# Patient Record
Sex: FEMALE | Race: ASIAN | Employment: FULL TIME | URBAN - METROPOLITAN AREA
[De-identification: names, ages, dates, MRNs, and addresses within clinical notes are randomized per-mention and may not be internally consistent; named-entity substitution may affect disease eponyms.]

---

## 2017-05-01 ENCOUNTER — TELEPHONE (OUTPATIENT)
Dept: FAMILY MEDICINE CLINIC | Facility: CLINIC | Age: 52
End: 2017-05-01

## 2017-05-01 DIAGNOSIS — Z12.31 ENCOUNTER FOR SCREENING MAMMOGRAM FOR BREAST CANCER: Primary | ICD-10-CM

## 2017-05-02 ENCOUNTER — HOSPITAL ENCOUNTER (OUTPATIENT)
Dept: MAMMOGRAPHY | Age: 52
Discharge: HOME | End: 2017-05-02
Attending: FAMILY MEDICINE
Payer: COMMERCIAL

## 2017-05-02 DIAGNOSIS — Z12.31 ENCOUNTER FOR SCREENING MAMMOGRAM FOR BREAST CANCER: ICD-10-CM

## 2017-05-02 PROCEDURE — 77067 SCR MAMMO BI INCL CAD: CPT

## 2017-05-10 ENCOUNTER — HOSPITAL ENCOUNTER (OUTPATIENT)
Dept: MAMMOGRAPHY | Facility: HOSPITAL | Age: 52
Discharge: HOME | End: 2017-05-10
Attending: FAMILY MEDICINE
Payer: COMMERCIAL

## 2017-05-10 DIAGNOSIS — R92.8 ABNORMAL MAMMOGRAM OF LEFT BREAST: ICD-10-CM

## 2017-05-10 PROCEDURE — 77065 DX MAMMO INCL CAD UNI: CPT | Performed by: FAMILY MEDICINE

## 2017-05-10 PROCEDURE — 76642 ULTRASOUND BREAST LIMITED: CPT | Performed by: FAMILY MEDICINE

## 2017-05-10 PROCEDURE — 77061 BREAST TOMOSYNTHESIS UNI: CPT | Performed by: FAMILY MEDICINE

## 2017-05-13 ENCOUNTER — TELEPHONE (OUTPATIENT)
Dept: FAMILY MEDICINE CLINIC | Facility: CLINIC | Age: 52
End: 2017-05-13

## 2017-05-13 DIAGNOSIS — N60.02 BREAST CYST, LEFT: Primary | ICD-10-CM

## 2017-05-13 NOTE — TELEPHONE ENCOUNTER
----- Message from Abiodun Gonsalez MD sent at 5/12/2017  8:01 AM CDT -----  Ferdinand looks benign, but needs repeat left US in 6 months. Please order.   Dx left breast cyst

## 2017-05-13 NOTE — TELEPHONE ENCOUNTER
Discussed additional view of L breast results with patient by phone, letting her know she needs to repeat a L breast US in 6 months.  Order entered per . . Patient verbalizes understanding.

## 2017-05-13 NOTE — PROGRESS NOTES
Quick Note:    Discussed additional view of L breast results with patient by phone, letting her know she needs to repeat a L breast US in 6 months. Order entered per .  Patient verbalizes understanding.  ______

## 2017-06-03 ENCOUNTER — APPOINTMENT (OUTPATIENT)
Dept: LAB | Age: 52
End: 2017-06-03
Attending: NURSE PRACTITIONER
Payer: COMMERCIAL

## 2017-06-03 ENCOUNTER — OFFICE VISIT (OUTPATIENT)
Dept: FAMILY MEDICINE CLINIC | Facility: CLINIC | Age: 52
End: 2017-06-03

## 2017-06-03 VITALS
DIASTOLIC BLOOD PRESSURE: 70 MMHG | HEIGHT: 61.5 IN | WEIGHT: 127.81 LBS | BODY MASS INDEX: 23.82 KG/M2 | SYSTOLIC BLOOD PRESSURE: 98 MMHG | TEMPERATURE: 98 F | RESPIRATION RATE: 16 BRPM | HEART RATE: 72 BPM

## 2017-06-03 DIAGNOSIS — Z13.0 SCREENING FOR ENDOCRINE, NUTRITIONAL, METABOLIC AND IMMUNITY DISORDER: ICD-10-CM

## 2017-06-03 DIAGNOSIS — Z13.29 SCREENING FOR ENDOCRINE, NUTRITIONAL, METABOLIC AND IMMUNITY DISORDER: ICD-10-CM

## 2017-06-03 DIAGNOSIS — Z13.228 SCREENING FOR ENDOCRINE, NUTRITIONAL, METABOLIC AND IMMUNITY DISORDER: ICD-10-CM

## 2017-06-03 DIAGNOSIS — Z13.21 SCREENING FOR ENDOCRINE, NUTRITIONAL, METABOLIC AND IMMUNITY DISORDER: ICD-10-CM

## 2017-06-03 DIAGNOSIS — D64.89 ANEMIA DUE TO OTHER CAUSE: ICD-10-CM

## 2017-06-03 DIAGNOSIS — Z00.00 ROUTINE GENERAL MEDICAL EXAMINATION AT A HEALTH CARE FACILITY: Primary | ICD-10-CM

## 2017-06-03 DIAGNOSIS — Z00.00 ROUTINE GENERAL MEDICAL EXAMINATION AT A HEALTH CARE FACILITY: ICD-10-CM

## 2017-06-03 PROCEDURE — 82728 ASSAY OF FERRITIN: CPT | Performed by: NURSE PRACTITIONER

## 2017-06-03 PROCEDURE — 82306 VITAMIN D 25 HYDROXY: CPT | Performed by: NURSE PRACTITIONER

## 2017-06-03 PROCEDURE — 90715 TDAP VACCINE 7 YRS/> IM: CPT | Performed by: NURSE PRACTITIONER

## 2017-06-03 PROCEDURE — 90471 IMMUNIZATION ADMIN: CPT | Performed by: NURSE PRACTITIONER

## 2017-06-03 PROCEDURE — 83550 IRON BINDING TEST: CPT | Performed by: NURSE PRACTITIONER

## 2017-06-03 PROCEDURE — 85027 COMPLETE CBC AUTOMATED: CPT | Performed by: NURSE PRACTITIONER

## 2017-06-03 PROCEDURE — 99396 PREV VISIT EST AGE 40-64: CPT | Performed by: NURSE PRACTITIONER

## 2017-06-03 PROCEDURE — 83540 ASSAY OF IRON: CPT | Performed by: NURSE PRACTITIONER

## 2017-06-03 PROCEDURE — 82607 VITAMIN B-12: CPT | Performed by: NURSE PRACTITIONER

## 2017-06-03 NOTE — PROGRESS NOTES
HPI:   Hi Draper is a 46year old female who presents for a complete physical exam. Rhina Jordan 1 year ago. Since LOV she had a bleeding diverticula which resulted in anemia. She brings work labs with last CBC in 3/2017 with level of 10.8.  She denies any b recurrent Breast CA, first dx late 46s   • Breast Cancer Mother      early 46s   • Alpha-thalassemia Trait[other] [place] Dy Daughter       Social History:     Smoking Status: Never Smoker                      Smokeless Status: Never Used Vitamin D, 25-Hydroxy [E]  Vitamin B12 [E]  TdaP (Adacel, Boostrix) (92869) (DX V06.1/Z23)    Patient Instructions   Immunizations reviewed, recommend: flu vaccine this fall season, Tdap booster given today.   Supplements recommended: Vitamin D 1000 IU ana As a woman ages, her ovaries begin to make hormones less regularly. In some months, there may not be ovulation. Without ovulation, progesterone isn't secreted so a normal period doesn't occur. The menstrual cycle will be harder to predict.  Over time, the o © 6730-2962 The [address] [place] [address], [address] [place]. All rights reserved. This information is not intended as a substitute for professional medical care. Always follow your healthcare professional's instructions.             Ret

## 2017-06-03 NOTE — PATIENT INSTRUCTIONS
Immunizations reviewed, recommend: flu vaccine this fall season, Tdap booster given today. Supplements recommended: Vitamin D 1000 IU daily, Calcium 500 mg twice a day with food if not part of diet. Recommend Fiber 25 grams daily.   Self breast exam month cause symptoms. Some women who have had their uterus taken out (hysterectomy) but still have ovaries may still have symptoms. When estrogen levels reach their lowest point, periods will stop completely. This is menopause.   Symptoms of perimenopause  The ch

## 2017-06-07 ENCOUNTER — TELEPHONE (OUTPATIENT)
Dept: FAMILY MEDICINE CLINIC | Facility: CLINIC | Age: 52
End: 2017-06-07

## 2017-06-07 DIAGNOSIS — D56.9 THALASSEMIA, UNSPECIFIED TYPE: Primary | ICD-10-CM

## 2017-06-07 NOTE — TELEPHONE ENCOUNTER
Spoke with Pt and spoke with the lab about doing additional testing for Thalassemia. This is a special test that will be sent out.  So the two options are Hemoglobinopathy with reflex Cascade code# [id number], or Hemoglobinopathy with reflex Electrophoresis

## 2017-07-01 ENCOUNTER — APPOINTMENT (OUTPATIENT)
Dept: GENERAL RADIOLOGY | Age: 52
End: 2017-07-01
Attending: FAMILY MEDICINE
Payer: COMMERCIAL

## 2017-07-01 ENCOUNTER — HOSPITAL ENCOUNTER (OUTPATIENT)
Age: 52
Discharge: HOME | End: 2017-07-01
Attending: FAMILY MEDICINE
Payer: COMMERCIAL

## 2017-07-01 VITALS
SYSTOLIC BLOOD PRESSURE: 134 MMHG | RESPIRATION RATE: 18 BRPM | TEMPERATURE: 98 F | DIASTOLIC BLOOD PRESSURE: 86 MMHG | OXYGEN SATURATION: 98 % | HEART RATE: 73 BPM

## 2017-07-01 DIAGNOSIS — S99.912A ANKLE INJURY, LEFT, INITIAL ENCOUNTER: ICD-10-CM

## 2017-07-01 DIAGNOSIS — S93.402A MODERATE ANKLE SPRAIN, LEFT, INITIAL ENCOUNTER: Primary | ICD-10-CM

## 2017-07-01 PROCEDURE — 99214 OFFICE O/P EST MOD 30 MIN: CPT

## 2017-07-01 PROCEDURE — 73610 X-RAY EXAM OF ANKLE: CPT | Performed by: FAMILY MEDICINE

## 2017-07-01 RX ORDER — IBUPROFEN 200 MG
200 TABLET ORAL EVERY 6 HOURS PRN
COMMUNITY

## 2017-07-01 RX ORDER — IBUPROFEN 800 MG/1
800 TABLET ORAL EVERY 8 HOURS PRN
Qty: 21 TABLET | Refills: 0 | Status: SHIPPED | OUTPATIENT
Start: 2017-07-01 | End: 2017-07-08

## 2017-07-01 NOTE — ED INITIAL ASSESSMENT (HPI)
Patient presents with left ankle injury from today when her foot fell asleep and she stood up and rolled her ankle externally. Swelling and throbbing pain. +CMS

## 2017-07-01 NOTE — ED PROVIDER NOTES
Patient Seen in: [facility] [place] [facility]    History   Patient presents with:  Lower Extremity Injury (musculoskeletal)    Stated Complaint: ANKLE SPRAIN     HPI  60-year-old female here with left ankle sprain, notes that she was seated for a Take by mouth.        Family History   Problem Relation Age of Onset   • Cancer Father 58     LUNG   • Cancer Mother      recurrent Breast CA, first dx late 46s   • Breast Cancer Mother      early 46s   • Alpha-thalassemia Trait[other] Hitesh Guerra Daughter Once          Order Comments:              ANKLE SPRAIN             Order Specific Question: What is the Relevant Clinical Indication / Reason for Exam?          Answer: ANKLE SPRAIN       Apply ace wrap Once          Order Comments:               To affect tolerated   Rx Ibuprofen 800mg every 8 hours with food / milk for anti-inflammatory properties (DO NOT TAKE ANY OTC MOTRIN / ADVIL WHILE ON THIS)   Worsening symptoms discussed and if so to return immediately   Possibility of occult fracture discussed and

## 2018-02-15 ENCOUNTER — TELEPHONE (OUTPATIENT)
Dept: FAMILY MEDICINE CLINIC | Facility: CLINIC | Age: 53
End: 2018-02-15

## 2018-02-15 DIAGNOSIS — Z00.00 LABORATORY EXAM ORDERED AS PART OF ROUTINE GENERAL MEDICAL EXAMINATION: Primary | ICD-10-CM

## 2018-02-15 DIAGNOSIS — Z12.31 VISIT FOR SCREENING MAMMOGRAM: ICD-10-CM

## 2018-02-15 NOTE — TELEPHONE ENCOUNTER
Patient scheduled her physical for 5/12/18 and would like labs called into [facility]. Thank you. She would also like a mammogram order prior to her appt.

## 2018-04-21 ENCOUNTER — LAB ENCOUNTER (OUTPATIENT)
Dept: LAB | Age: 53
End: 2018-04-21
Attending: FAMILY MEDICINE
Payer: COMMERCIAL

## 2018-04-21 ENCOUNTER — HOSPITAL ENCOUNTER (OUTPATIENT)
Dept: MAMMOGRAPHY | Age: 53
Discharge: HOME | End: 2018-04-21
Attending: FAMILY MEDICINE
Payer: COMMERCIAL

## 2018-04-21 DIAGNOSIS — Z12.31 VISIT FOR SCREENING MAMMOGRAM: ICD-10-CM

## 2018-04-21 DIAGNOSIS — Z00.00 LABORATORY EXAM ORDERED AS PART OF ROUTINE GENERAL MEDICAL EXAMINATION: ICD-10-CM

## 2018-04-21 PROCEDURE — 77063 BREAST TOMOSYNTHESIS BI: CPT | Performed by: FAMILY MEDICINE

## 2018-04-21 PROCEDURE — 85025 COMPLETE CBC W/AUTO DIFF WBC: CPT

## 2018-04-21 PROCEDURE — 82728 ASSAY OF FERRITIN: CPT

## 2018-04-21 PROCEDURE — 77067 SCR MAMMO BI INCL CAD: CPT | Performed by: FAMILY MEDICINE

## 2018-04-21 PROCEDURE — 80050 GENERAL HEALTH PANEL: CPT

## 2018-04-21 PROCEDURE — 36415 COLL VENOUS BLD VENIPUNCTURE: CPT

## 2018-04-21 PROCEDURE — 80061 LIPID PANEL: CPT

## 2018-04-21 PROCEDURE — 80053 COMPREHEN METABOLIC PANEL: CPT

## 2018-05-12 ENCOUNTER — OFFICE VISIT (OUTPATIENT)
Dept: FAMILY MEDICINE CLINIC | Facility: CLINIC | Age: 53
End: 2018-05-12

## 2018-05-12 VITALS
SYSTOLIC BLOOD PRESSURE: 110 MMHG | BODY MASS INDEX: 24.79 KG/M2 | DIASTOLIC BLOOD PRESSURE: 80 MMHG | WEIGHT: 133 LBS | HEART RATE: 84 BPM | HEIGHT: 61.5 IN | TEMPERATURE: 98 F | RESPIRATION RATE: 16 BRPM

## 2018-05-12 DIAGNOSIS — Z11.51 SCREENING FOR HUMAN PAPILLOMAVIRUS (HPV): ICD-10-CM

## 2018-05-12 DIAGNOSIS — Z12.83 SKIN CANCER SCREENING: ICD-10-CM

## 2018-05-12 DIAGNOSIS — Z00.00 ROUTINE PHYSICAL EXAMINATION: Primary | ICD-10-CM

## 2018-05-12 PROCEDURE — 87624 HPV HI-RISK TYP POOLED RSLT: CPT | Performed by: FAMILY MEDICINE

## 2018-05-12 PROCEDURE — 88175 CYTOPATH C/V AUTO FLUID REDO: CPT | Performed by: FAMILY MEDICINE

## 2018-05-12 PROCEDURE — 99396 PREV VISIT EST AGE 40-64: CPT | Performed by: FAMILY MEDICINE

## 2018-05-12 NOTE — PROGRESS NOTES
HPI:   Oneil Jones is a 46year old female who presents for a complete physical exam.   (twins)  Last pap:   - normal, negative HPV  Last mammogram:  2018;  Hx of right breast biopsy and fibrocystic disease  Menses:  Irregular, perimenopausal PNEUMONIA   Family History   Problem Relation Age of Onset   • Cancer Father 58     LUNG   • Cancer Mother      recurrent Breast CA, first dx late 46s   • Breast Cancer Mother      early 46s   • [address] [facility], [address] [place] [address] Daughter       Social History human papillomavirus (hpv)  · Pap done today  · Mammogram:  annually. Self breast exam explained and encouraged to perform monthly. · Dexascan:  n/a. Recommended daily Vit D supplements.   · Labs reviewed  · Colonoscopy:  UTD  · Vaccines recommended toda

## 2019-07-12 ENCOUNTER — TELEPHONE (OUTPATIENT)
Dept: FAMILY MEDICINE CLINIC | Facility: CLINIC | Age: 54
End: 2019-07-12

## 2019-07-12 DIAGNOSIS — Z13.220 SCREENING, LIPID: ICD-10-CM

## 2019-07-12 DIAGNOSIS — Z13.29 SCREENING FOR ENDOCRINE, METABOLIC AND IMMUNITY DISORDER: ICD-10-CM

## 2019-07-12 DIAGNOSIS — Z13.0 SCREENING FOR ENDOCRINE, METABOLIC AND IMMUNITY DISORDER: ICD-10-CM

## 2019-07-12 DIAGNOSIS — Z13.0 SCREENING, ANEMIA, DEFICIENCY, IRON: Primary | ICD-10-CM

## 2019-07-12 DIAGNOSIS — Z13.228 SCREENING FOR ENDOCRINE, METABOLIC AND IMMUNITY DISORDER: ICD-10-CM

## 2019-07-12 DIAGNOSIS — Z12.39 SCREENING FOR BREAST CANCER: Primary | ICD-10-CM

## 2019-07-12 DIAGNOSIS — Z13.29 SCREENING FOR THYROID DISORDER: ICD-10-CM

## 2019-07-12 NOTE — TELEPHONE ENCOUNTER
Please enter lab orders for the patient's upcoming physical appointment. Physical scheduled:    Your appointments     Date & Time Appointment Department [facility])    Sep 03, 2019  3:00 PM CDT Physical - Established Patient with MD Michele Garcia

## 2019-07-12 NOTE — TELEPHONE ENCOUNTER
LOV 5/12/18. Future Appointments   Date Time Provider Uzair Nelson   9/3/2019  3:00 PM Monet Watson MD [facility] 3 [facility] [place]     It has been over one year since LOV. Fails protocol. Order for mammogram pended. Routed to Dr Yohana Beatty.

## 2019-07-22 ENCOUNTER — HOSPITAL ENCOUNTER (OUTPATIENT)
Dept: MAMMOGRAPHY | Age: 54
Discharge: HOME | End: 2019-07-22
Attending: FAMILY MEDICINE
Payer: COMMERCIAL

## 2019-07-22 DIAGNOSIS — Z12.39 SCREENING FOR BREAST CANCER: ICD-10-CM

## 2019-07-22 PROCEDURE — 77067 SCR MAMMO BI INCL CAD: CPT | Performed by: FAMILY MEDICINE

## 2019-07-22 PROCEDURE — 77063 BREAST TOMOSYNTHESIS BI: CPT | Performed by: FAMILY MEDICINE

## 2019-08-15 ENCOUNTER — HOSPITAL ENCOUNTER (OUTPATIENT)
Dept: ULTRASOUND IMAGING | Age: 54
Discharge: HOME | End: 2019-08-15
Attending: FAMILY MEDICINE
Payer: COMMERCIAL

## 2019-08-15 ENCOUNTER — TELEPHONE (OUTPATIENT)
Dept: FAMILY MEDICINE CLINIC | Facility: CLINIC | Age: 54
End: 2019-08-15

## 2019-08-15 ENCOUNTER — HOSPITAL ENCOUNTER (OUTPATIENT)
Dept: MAMMOGRAPHY | Age: 54
Discharge: HOME | End: 2019-08-15
Attending: FAMILY MEDICINE
Payer: COMMERCIAL

## 2019-08-15 DIAGNOSIS — R92.2 INCONCLUSIVE MAMMOGRAM: ICD-10-CM

## 2019-08-15 DIAGNOSIS — R92.8 ABNORMALITY OF RIGHT BREAST ON SCREENING MAMMOGRAM: Primary | ICD-10-CM

## 2019-08-15 PROCEDURE — 76642 ULTRASOUND BREAST LIMITED: CPT | Performed by: FAMILY MEDICINE

## 2019-08-15 PROCEDURE — 77062 BREAST TOMOSYNTHESIS BI: CPT | Performed by: FAMILY MEDICINE

## 2019-08-15 PROCEDURE — 77066 DX MAMMO INCL CAD BI: CPT | Performed by: FAMILY MEDICINE

## 2019-08-15 NOTE — TELEPHONE ENCOUNTER
Mammography called to let us know that the patient's mammogram came back abnormal and they radiologist is recommending a biopsy.   She just wanted Suad to read the

## 2019-08-15 NOTE — IMAGING NOTE
This Breast Care RN assisted Dr. Marlon Ty with recommendation for a right breast 1 site stereotactic biopsy for calcifications. Procedure reviewed and all questions answered. Emotional and educational support given.    On the day of the biopsy, pt instruc

## 2019-08-22 ENCOUNTER — HOSPITAL ENCOUNTER (OUTPATIENT)
Dept: MAMMOGRAPHY | Facility: HOSPITAL | Age: 54
Discharge: HOME | End: 2019-08-22
Attending: FAMILY MEDICINE
Payer: COMMERCIAL

## 2019-08-22 DIAGNOSIS — R92.8 ABNORMALITY OF RIGHT BREAST ON SCREENING MAMMOGRAM: ICD-10-CM

## 2019-08-22 PROCEDURE — 88305 TISSUE EXAM BY PATHOLOGIST: CPT | Performed by: FAMILY MEDICINE

## 2019-08-22 PROCEDURE — 19081 BX BREAST 1ST LESION STRTCTC: CPT | Performed by: FAMILY MEDICINE

## 2019-08-27 ENCOUNTER — TELEPHONE (OUTPATIENT)
Dept: MAMMOGRAPHY | Facility: HOSPITAL | Age: 54
End: 2019-08-27

## 2019-08-27 NOTE — TELEPHONE ENCOUNTER
LM to call back. Attempting to reach pt to discuss benign and concordant breast biopsy results. Follow up after breast biopsy for healing.  Encouraged to call back

## 2019-08-28 ENCOUNTER — TELEPHONE (OUTPATIENT)
Dept: MAMMOGRAPHY | Facility: HOSPITAL | Age: 54
End: 2019-08-28

## 2019-08-28 NOTE — TELEPHONE ENCOUNTER
Telephoned [facility] and name,  verified with pt. [facility] of benign RB stereo biopsy result. Concordance verified by radiologist, Dr. Aryan Haro. [facility] reports biopsy site is healing well. Hematoma management discussed.  [facility]

## 2019-09-03 ENCOUNTER — OFFICE VISIT (OUTPATIENT)
Dept: FAMILY MEDICINE CLINIC | Facility: CLINIC | Age: 54
End: 2019-09-03
Payer: COMMERCIAL

## 2019-09-03 VITALS
TEMPERATURE: 98 F | DIASTOLIC BLOOD PRESSURE: 60 MMHG | SYSTOLIC BLOOD PRESSURE: 100 MMHG | RESPIRATION RATE: 16 BRPM | BODY MASS INDEX: 24.73 KG/M2 | WEIGHT: 131 LBS | HEART RATE: 72 BPM | HEIGHT: 61 IN

## 2019-09-03 DIAGNOSIS — R05.8 POST-VIRAL COUGH SYNDROME: ICD-10-CM

## 2019-09-03 DIAGNOSIS — Z00.00 ROUTINE GENERAL MEDICAL EXAMINATION AT A HEALTH CARE FACILITY: Primary | ICD-10-CM

## 2019-09-03 DIAGNOSIS — R92.8 ABNORMAL MAMMOGRAM: ICD-10-CM

## 2019-09-03 PROCEDURE — 99396 PREV VISIT EST AGE 40-64: CPT | Performed by: FAMILY MEDICINE

## 2019-09-03 RX ORDER — PREDNISONE 20 MG/1
40 TABLET ORAL DAILY
Qty: 10 TABLET | Refills: 0 | Status: SHIPPED | OUTPATIENT
Start: 2019-09-03 | End: 2019-09-08

## 2019-09-03 NOTE — PROGRESS NOTES
HPI:   Alda Mendoza is a 48year old female who presents for a complete physical exam.   (twins)  Last pap:    Last mammogram:    Menses: LMP 3/2019  Contraception: Vasectomy  Previous colonoscopy:  2016  Previous DEXA:  n/a.   Current or p (CPT=19281)  2005   • OTHER SURGICAL HISTORY  2001    PNEUMONIA      Family History   Problem Relation Age of Onset   • Cancer Father 58        LUNG   • Cancer Mother         recurrent Breast CA, first dx late 46s   • Breast Cancer Mother         early 46s regular aerobic exercise. · The patient is asked to return for CPX in 1 year. · Prednisone as directed for post viral cough. Side effects reviewed. No orders of the defined types were placed in this encounter.       Meds & Refills for this Visit:  Reque

## 2019-10-22 ENCOUNTER — OFFICE VISIT (OUTPATIENT)
Dept: FAMILY MEDICINE CLINIC | Facility: CLINIC | Age: 54
End: 2019-10-22
Payer: COMMERCIAL

## 2019-10-22 ENCOUNTER — TELEPHONE (OUTPATIENT)
Dept: FAMILY MEDICINE CLINIC | Facility: CLINIC | Age: 54
End: 2019-10-22

## 2019-10-22 VITALS
BODY MASS INDEX: 25.07 KG/M2 | WEIGHT: 132.81 LBS | TEMPERATURE: 98 F | DIASTOLIC BLOOD PRESSURE: 70 MMHG | HEART RATE: 76 BPM | RESPIRATION RATE: 14 BRPM | HEIGHT: 61 IN | SYSTOLIC BLOOD PRESSURE: 118 MMHG

## 2019-10-22 DIAGNOSIS — N76.4 LABIAL ABSCESS: Primary | ICD-10-CM

## 2019-10-22 DIAGNOSIS — K64.4 EXTERNAL HEMORRHOID: ICD-10-CM

## 2019-10-22 DIAGNOSIS — B37.3 VAGINA, CANDIDIASIS: ICD-10-CM

## 2019-10-22 PROCEDURE — 99214 OFFICE O/P EST MOD 30 MIN: CPT | Performed by: FAMILY MEDICINE

## 2019-10-22 RX ORDER — CEPHALEXIN 500 MG/1
500 CAPSULE ORAL 3 TIMES DAILY
Qty: 30 CAPSULE | Refills: 0 | Status: SHIPPED | OUTPATIENT
Start: 2019-10-22 | End: 2019-11-05 | Stop reason: ALTCHOICE

## 2019-10-22 RX ORDER — FLUCONAZOLE 150 MG/1
150 TABLET ORAL ONCE
Qty: 3 TABLET | Refills: 0 | Status: SHIPPED | OUTPATIENT
Start: 2019-10-22 | End: 2019-10-22

## 2019-10-22 NOTE — TELEPHONE ENCOUNTER
Patient called states has a possible boil and yeast infection, asked to speak with nurse, no available appointments today.

## 2019-10-22 NOTE — PROGRESS NOTES
Chief Complaint:  Patient presents with:  Derm Problem: Painful bump on vagina for  5 days. Vaginal Problem: Pt complains of vaginal itching. Pt has tried Vagisil cream w/little relief.     HPI:  This is a 47year old female patient presenting for Derm Pr standard drinks    Drug use: No       fluconazole (DIFLUCAN) 150 MG Oral Tab, Take 1 tablet (150 mg total) by mouth once for 1 dose. May repeat in 2-3 days and then again after completing antibiotics if not improved. , Disp: 3 tablet, Rfl: 0  cephALEXin (KE abscess  -    Offered I and D but she declined. Will try warm water soaks and cephALEXin (KEFLEX) 500 MG Oral Cap; Take 1 capsule (500 mg total) by mouth 3 (three) times daily. External hemorrhoid  To monitor as it is not causing symptoms at this time.

## 2019-10-22 NOTE — TELEPHONE ENCOUNTER
Patient c/o a boil to left labia x 5-6 days. Patient experiencing vaginal itching x 4 days. Denies discharge. Has been applying \"Boil Ease\" cream and vagisil. Used Monistat 1 day last night. Appt given for today with Dr. Mahi Brandt.  Patient verbalized unders

## 2019-10-29 ENCOUNTER — TELEPHONE (OUTPATIENT)
Dept: FAMILY MEDICINE CLINIC | Facility: CLINIC | Age: 54
End: 2019-10-29

## 2019-10-29 DIAGNOSIS — B37.3 CANDIDA VAGINITIS: Primary | ICD-10-CM

## 2019-10-29 NOTE — TELEPHONE ENCOUNTER
Patient calling to report her yeast sx are not yet resolved. Still has some discharge and itching, but not quite as bad as previously. She took two doses of diflucan. Last dose was on Friday 10/25/2019. Prior to that patient took Monistat 1 day.  She is ask

## 2019-10-29 NOTE — TELEPHONE ENCOUNTER
Let's try prescription terconazole. I have sent in. Also, has abscess resolved. If symptoms not improving, to be seen in the office for recheck. Please let me know if you have any questions.   Samson Salas, DO 10/29/2019 2:52 PM

## 2019-10-29 NOTE — TELEPHONE ENCOUNTER
Pt said that the one boil that was ready to pop is now gone and there is just another small one that isn't doing anything. I told her to monitor that and if anything changes with it or she gets more, that you wanted to see her back in the office.

## 2019-10-29 NOTE — TELEPHONE ENCOUNTER
Patient requesting to speak with nurse states her symptoms from yeast infection have not gone away, has questions on the antibiotics as well

## 2019-11-04 ENCOUNTER — TELEPHONE (OUTPATIENT)
Dept: FAMILY MEDICINE CLINIC | Facility: CLINIC | Age: 54
End: 2019-11-04

## 2019-11-04 RX ORDER — FLUCONAZOLE 150 MG/1
TABLET ORAL
Refills: 0 | COMMUNITY
Start: 2019-10-22 | End: 2019-11-05 | Stop reason: ALTCHOICE

## 2019-11-04 NOTE — TELEPHONE ENCOUNTER
Called and LMOM to call back has had both fluconazole tablet and Terconazole cream without relief.  Should be seen to make sure this is yeast.

## 2019-11-04 NOTE — TELEPHONE ENCOUNTER
Pt still having Yeast infection symptoms. Does she need an appt or can she gets meds?  She did schedule an appt for tomorrow just in case but still requesting a call back

## 2019-11-05 ENCOUNTER — OFFICE VISIT (OUTPATIENT)
Dept: FAMILY MEDICINE CLINIC | Facility: CLINIC | Age: 54
End: 2019-11-05
Payer: COMMERCIAL

## 2019-11-05 VITALS
HEART RATE: 64 BPM | TEMPERATURE: 98 F | WEIGHT: 132 LBS | SYSTOLIC BLOOD PRESSURE: 118 MMHG | RESPIRATION RATE: 16 BRPM | HEIGHT: 61.5 IN | DIASTOLIC BLOOD PRESSURE: 70 MMHG | BODY MASS INDEX: 24.6 KG/M2

## 2019-11-05 DIAGNOSIS — N89.8 VAGINA ITCHING: Primary | ICD-10-CM

## 2019-11-05 PROCEDURE — 99213 OFFICE O/P EST LOW 20 MIN: CPT | Performed by: FAMILY MEDICINE

## 2019-11-05 PROCEDURE — 87510 GARDNER VAG DNA DIR PROBE: CPT | Performed by: FAMILY MEDICINE

## 2019-11-05 PROCEDURE — 87480 CANDIDA DNA DIR PROBE: CPT | Performed by: FAMILY MEDICINE

## 2019-11-05 PROCEDURE — 87660 TRICHOMONAS VAGIN DIR PROBE: CPT | Performed by: FAMILY MEDICINE

## 2019-11-05 RX ORDER — METRONIDAZOLE 7.5 MG/G
1 GEL VAGINAL NIGHTLY
Qty: 70 G | Refills: 0 | Status: SHIPPED | OUTPATIENT
Start: 2019-11-05 | End: 2019-11-12

## 2019-11-05 NOTE — PROGRESS NOTES
Chief Complaint:  Patient presents with:  Vaginal Problem:  Follow Up from 10-, States Boil has gone away but itchiness is still ongoing- having white discharge    HPI:  This is a 47year old female patient presenting for Vaginal Problem (Follow Up f Smoker      Smokeless tobacco: Never Used    Alcohol use: No      Alcohol/week: 0.0 standard drinks    Drug use: No       Current Outpatient Medications   Medication Sig Dispense Refill   • metRONIDAZOLE 0.75 % Vaginal Gel Place 1 applicator vaginally lora Vaginal Gel; Place 1 applicator vaginally nightly for 7 days. -     VAGINITIS/VAGINOSIS, DNA PROBE; Future    Will call with results.    Mortimer Lapidus, DO  11/5/2019 9:21 AM  [facility]

## 2020-01-19 ENCOUNTER — OFFICE VISIT (OUTPATIENT)
Dept: FAMILY MEDICINE CLINIC | Facility: CLINIC | Age: 55
End: 2020-01-19
Payer: COMMERCIAL

## 2020-01-19 VITALS
TEMPERATURE: 99 F | OXYGEN SATURATION: 98 % | HEIGHT: 61.5 IN | HEART RATE: 87 BPM | BODY MASS INDEX: 24.79 KG/M2 | SYSTOLIC BLOOD PRESSURE: 116 MMHG | WEIGHT: 133 LBS | DIASTOLIC BLOOD PRESSURE: 76 MMHG

## 2020-01-19 DIAGNOSIS — J01.40 ACUTE NON-RECURRENT PANSINUSITIS: Primary | ICD-10-CM

## 2020-01-19 PROCEDURE — 99213 OFFICE O/P EST LOW 20 MIN: CPT | Performed by: NURSE PRACTITIONER

## 2020-01-19 RX ORDER — AMOXICILLIN AND CLAVULANATE POTASSIUM 875; 125 MG/1; MG/1
1 TABLET, FILM COATED ORAL 2 TIMES DAILY
Qty: 20 TABLET | Refills: 0 | Status: SHIPPED | OUTPATIENT
Start: 2020-01-19 | End: 2020-01-29

## 2020-01-19 NOTE — PROGRESS NOTES
CHIEF COMPLAINT:   Patient presents with:  Sinus Problem: cough,congestion and sinus pressure sx x 10 days. HPI:   Araceli Flores is a 47year old female who presents for sinus congestion for  10  days.  Symptoms have been getting better but recenrly wo Smokeless tobacco: Never Used    Alcohol use: No      Alcohol/week: 0.0 standard drinks    Drug use: No        REVIEW OF SYSTEMS:   GENERAL: feels well otherwise, no unplanned weight change,  good appetite  SKIN: no rashes or abnormal skin lesions  ELVIN PLAN: Meds and instructions as below. Comfort care instructions as listed in Patient Instructions. To f/u with PCP if no improvement in 3-5 days or sooner if sx worsen.     Meds & Refills for this Visit:  Requested Prescriptions     Signed Prescriptions D · You can use an over-the-counter decongestant, unless a similar medicine was prescribed to you. Nasal sprays work the fastest. Use one that contains phenylephrine or oxymetazoline. First blow your nose gently. Then use the spray.  Do not use these medicine · Don’t have close contact with people who have sore throats, colds, or other upper respiratory infections. · Don’t smoke, and stay away from secondhand smoke. · Stay up to date with of your vaccines.   Date Last Reviewed: 11/1/2017  © 9452-6151 The [employer]

## 2020-01-20 ENCOUNTER — TELEPHONE (OUTPATIENT)
Dept: FAMILY MEDICINE CLINIC | Facility: CLINIC | Age: 55
End: 2020-01-20

## 2020-01-20 NOTE — TELEPHONE ENCOUNTER
Patient called states went to [facility] yesterday and is having a side effect to the amoxicillin, having runny stools wants to know if should continue taking or if should get another antibiotic?

## 2020-01-20 NOTE — TELEPHONE ENCOUNTER
Patient reports one episode of loose stool since starting Augmentin. She is not taking with probiotic. Suggest she start this and be sure to take abx with food. She verbalized understanding and agrees with plan.

## 2020-01-21 ENCOUNTER — TELEPHONE (OUTPATIENT)
Dept: FAMILY MEDICINE CLINIC | Facility: CLINIC | Age: 55
End: 2020-01-21

## 2020-01-21 NOTE — TELEPHONE ENCOUNTER
Talked to patient and went over why she needs to finish the antibiotic she should be taking a probiotic and lots of fluids. She wanted to know if she can take an antidiarrheal medication and I told her yes for this problem. So she will try this.

## 2020-01-21 NOTE — TELEPHONE ENCOUNTER
Patient is calling to report ongoing diarrhea while taking antibiotics. Patient would like to know if she should continue medication.  Diarrhea comes every time after eating

## 2021-06-23 ENCOUNTER — TELEPHONE (OUTPATIENT)
Dept: FAMILY MEDICINE CLINIC | Facility: CLINIC | Age: 56
End: 2021-06-23

## 2021-06-23 DIAGNOSIS — Z12.31 ENCOUNTER FOR SCREENING MAMMOGRAM FOR BREAST CANCER: Primary | ICD-10-CM

## 2021-06-23 DIAGNOSIS — Z00.00 ROUTINE GENERAL MEDICAL EXAMINATION AT A HEALTH CARE FACILITY: ICD-10-CM

## 2021-06-23 NOTE — TELEPHONE ENCOUNTER
Please enter lab orders for the patient's upcoming physical appointment. Physical scheduled:    Your appointments     Date & Time Appointment Department [facility])    Jul 09, 2021 11:00 AM CDT Physical - Established with Anastacio Burdick DO [address] [place] [address]

## 2021-06-28 NOTE — TELEPHONE ENCOUNTER
Called pt advised labs have been placed. Advised pt to fast for 10-12 hours. Pt verbalized understanding.

## 2022-06-16 ENCOUNTER — TELEPHONE (OUTPATIENT)
Dept: FAMILY MEDICINE CLINIC | Facility: CLINIC | Age: 57
End: 2022-06-16

## 2022-06-16 DIAGNOSIS — Z00.00 ROUTINE GENERAL MEDICAL EXAMINATION AT A HEALTH CARE FACILITY: ICD-10-CM

## 2022-06-16 DIAGNOSIS — Z12.31 VISIT FOR SCREENING MAMMOGRAM: Primary | ICD-10-CM

## 2022-06-16 NOTE — TELEPHONE ENCOUNTER
Please enter lab orders for the patient's upcoming physical appointment. Physical scheduled: Your appointments     Date & Time Appointment Department [facility])    Sep 26, 2022  3:30 PM CDT Physical - Established with Warner Fernando MD [facility], [address], [place]  ([address] [place] [address])            [address] [facility] [phone]         Preferred lab: [address] [facility])     The patient has been notified to complete fasting labs prior to their physical appointment.         PT IS ALSO REQUESTING ORDER TO HAVE MAMMOGRAM DONE

## 2022-06-29 ENCOUNTER — LABORATORY ENCOUNTER (OUTPATIENT)
Dept: LAB | Age: 57
End: 2022-06-29
Attending: FAMILY MEDICINE
Payer: COMMERCIAL

## 2022-06-29 ENCOUNTER — HOSPITAL ENCOUNTER (OUTPATIENT)
Dept: MAMMOGRAPHY | Age: 57
Discharge: HOME | End: 2022-06-29
Attending: FAMILY MEDICINE
Payer: COMMERCIAL

## 2022-06-29 ENCOUNTER — APPOINTMENT (OUTPATIENT)
Dept: LAB | Age: 57
End: 2022-06-29
Attending: FAMILY MEDICINE
Payer: COMMERCIAL

## 2022-06-29 DIAGNOSIS — Z12.31 VISIT FOR SCREENING MAMMOGRAM: ICD-10-CM

## 2022-06-29 DIAGNOSIS — Z00.00 ROUTINE GENERAL MEDICAL EXAMINATION AT A HEALTH CARE FACILITY: ICD-10-CM

## 2022-06-29 LAB
ALBUMIN SERPL-MCNC: 4.1 G/DL (ref 3.4–5)
ALBUMIN/GLOB SERPL: 1 {RATIO} (ref 1–2)
ALP LIVER SERPL-CCNC: 82 U/L
ALT SERPL-CCNC: 21 U/L
ANION GAP SERPL CALC-SCNC: 6 MMOL/L (ref 0–18)
AST SERPL-CCNC: 20 U/L (ref 15–37)
BILIRUB SERPL-MCNC: 0.6 MG/DL (ref 0.1–2)
BUN BLD-MCNC: 10 MG/DL (ref 7–18)
CALCIUM BLD-MCNC: 9.7 MG/DL (ref 8.5–10.1)
CHLORIDE SERPL-SCNC: 106 MMOL/L (ref 98–112)
CHOLEST SERPL-MCNC: 226 MG/DL (ref ?–200)
CO2 SERPL-SCNC: 27 MMOL/L (ref 21–32)
CREAT BLD-MCNC: 0.68 MG/DL
ERYTHROCYTE [DISTWIDTH] IN BLOOD BY AUTOMATED COUNT: 19.4 %
FASTING PATIENT LIPID ANSWER: YES
FASTING STATUS PATIENT QL REPORTED: YES
GLOBULIN PLAS-MCNC: 4.1 G/DL (ref 2.8–4.4)
GLUCOSE BLD-MCNC: 104 MG/DL (ref 70–99)
HCT VFR BLD AUTO: 46.1 %
HDLC SERPL-MCNC: 72 MG/DL (ref 40–59)
HGB BLD-MCNC: 13.9 G/DL
LDLC SERPL CALC-MCNC: 137 MG/DL (ref ?–100)
MCH RBC QN AUTO: 20.2 PG (ref 26–34)
MCHC RBC AUTO-ENTMCNC: 30.2 G/DL (ref 31–37)
MCV RBC AUTO: 66.9 FL
NONHDLC SERPL-MCNC: 154 MG/DL (ref ?–130)
OSMOLALITY SERPL CALC.SUM OF ELEC: 287 MOSM/KG (ref 275–295)
PLATELET # BLD AUTO: 202 10(3)UL (ref 150–450)
POTASSIUM SERPL-SCNC: 3.8 MMOL/L (ref 3.5–5.1)
PROT SERPL-MCNC: 8.2 G/DL (ref 6.4–8.2)
RBC # BLD AUTO: 6.89 X10(6)UL
SODIUM SERPL-SCNC: 139 MMOL/L (ref 136–145)
TRIGL SERPL-MCNC: 95 MG/DL (ref 30–149)
TSI SER-ACNC: 0.43 MIU/ML (ref 0.36–3.74)
VLDLC SERPL CALC-MCNC: 17 MG/DL (ref 0–30)
WBC # BLD AUTO: 7.8 X10(3) UL (ref 4–11)

## 2022-06-29 PROCEDURE — 80053 COMPREHEN METABOLIC PANEL: CPT | Performed by: FAMILY MEDICINE

## 2022-06-29 PROCEDURE — 80061 LIPID PANEL: CPT | Performed by: FAMILY MEDICINE

## 2022-06-29 PROCEDURE — 77063 BREAST TOMOSYNTHESIS BI: CPT | Performed by: FAMILY MEDICINE

## 2022-06-29 PROCEDURE — 84443 ASSAY THYROID STIM HORMONE: CPT | Performed by: FAMILY MEDICINE

## 2022-06-29 PROCEDURE — 85027 COMPLETE CBC AUTOMATED: CPT | Performed by: FAMILY MEDICINE

## 2022-06-29 PROCEDURE — 77067 SCR MAMMO BI INCL CAD: CPT | Performed by: FAMILY MEDICINE

## 2022-08-08 ENCOUNTER — HOSPITAL ENCOUNTER (OUTPATIENT)
Dept: MAMMOGRAPHY | Facility: HOSPITAL | Age: 57
Discharge: HOME | End: 2022-08-08
Attending: FAMILY MEDICINE
Payer: COMMERCIAL

## 2022-08-08 DIAGNOSIS — R92.2 INCONCLUSIVE MAMMOGRAM: ICD-10-CM

## 2022-08-08 PROCEDURE — 77062 BREAST TOMOSYNTHESIS BI: CPT | Performed by: FAMILY MEDICINE

## 2022-08-08 PROCEDURE — 76642 ULTRASOUND BREAST LIMITED: CPT | Performed by: FAMILY MEDICINE

## 2022-08-08 PROCEDURE — 77066 DX MAMMO INCL CAD BI: CPT | Performed by: FAMILY MEDICINE

## 2022-09-26 ENCOUNTER — OFFICE VISIT (OUTPATIENT)
Dept: FAMILY MEDICINE CLINIC | Facility: CLINIC | Age: 57
End: 2022-09-26

## 2022-09-26 VITALS
DIASTOLIC BLOOD PRESSURE: 64 MMHG | TEMPERATURE: 97 F | WEIGHT: 145 LBS | HEIGHT: 62 IN | SYSTOLIC BLOOD PRESSURE: 104 MMHG | RESPIRATION RATE: 16 BRPM | BODY MASS INDEX: 26.68 KG/M2 | HEART RATE: 72 BPM

## 2022-09-26 DIAGNOSIS — Z12.31 VISIT FOR SCREENING MAMMOGRAM: ICD-10-CM

## 2022-09-26 DIAGNOSIS — Z12.4 SCREENING FOR CERVICAL CANCER: ICD-10-CM

## 2022-09-26 DIAGNOSIS — Z00.00 ROUTINE GENERAL MEDICAL EXAMINATION AT A HEALTH CARE FACILITY: Primary | ICD-10-CM

## 2022-09-26 DIAGNOSIS — Z11.51 SCREENING FOR HUMAN PAPILLOMAVIRUS (HPV): ICD-10-CM

## 2022-09-26 PROCEDURE — 3078F DIAST BP <80 MM HG: CPT | Performed by: FAMILY MEDICINE

## 2022-09-26 PROCEDURE — 3074F SYST BP LT 130 MM HG: CPT | Performed by: FAMILY MEDICINE

## 2022-09-26 PROCEDURE — 99396 PREV VISIT EST AGE 40-64: CPT | Performed by: FAMILY MEDICINE

## 2022-09-26 PROCEDURE — 3008F BODY MASS INDEX DOCD: CPT | Performed by: FAMILY MEDICINE

## 2022-09-26 PROCEDURE — 87624 HPV HI-RISK TYP POOLED RSLT: CPT | Performed by: FAMILY MEDICINE

## 2022-09-26 RX ORDER — NAPROXEN 500 MG/1
500 TABLET ORAL 2 TIMES DAILY WITH MEALS
Qty: 30 TABLET | Refills: 0 | Status: SHIPPED | OUTPATIENT
Start: 2022-09-26

## 2022-09-27 LAB — HPV I/H RISK 1 DNA SPEC QL NAA+PROBE: NEGATIVE

## 2022-10-06 LAB — LAST PAP RESULT: NORMAL

## 2022-11-11 ENCOUNTER — TELEPHONE (OUTPATIENT)
Dept: FAMILY MEDICINE CLINIC | Facility: CLINIC | Age: 57
End: 2022-11-11

## 2022-11-11 NOTE — TELEPHONE ENCOUNTER
Patient reports symptoms started Sunday with some itching. Monday developed a boil to the labia but with no itching. Tuesday itching returned and boiled subsided. Using warm compresses, \"boil-eeze\" and vagisil. Denies any discharge. When boil is present it is painful. Had been bad this morning when initially called this office. Used hydrocortisone cream on it and now feels fine. Still wants to be seen. Discussed signs of infection and if worsening over the weekend to present to [address] [place] [address]. To continue home care. Patient is upset that she felt she was discouraged from making an appointment today which was guaranteed for Monday and was told to speak with triage. Now there is no availability Monday. Spoke with Jatinder Calderón. He is able to see her Monday. Patient is agreeable. Appointment scheduled. Time confirmed.

## 2022-12-30 RX ORDER — NAPROXEN 500 MG/1
500 TABLET ORAL
Qty: 30 TABLET | Refills: 5 | Status: SHIPPED | OUTPATIENT
Start: 2022-12-30

## 2023-05-30 ENCOUNTER — TELEPHONE (OUTPATIENT)
Dept: FAMILY MEDICINE CLINIC | Facility: CLINIC | Age: 58
End: 2023-05-30

## 2023-05-30 DIAGNOSIS — Z00.00 ROUTINE GENERAL MEDICAL EXAMINATION AT A HEALTH CARE FACILITY: Primary | ICD-10-CM

## 2023-05-30 NOTE — TELEPHONE ENCOUNTER
Please enter lab orders for the patient's upcoming physical appointment. Physical scheduled: Your appointments     Date & Time Appointment Department [facility])    Sep 29, 2023  8:15 AM CDT Physical - Established with Agnieszka Barboza MD [address], [address], [place] ([address])            [address], [address] [place] [facility] [address] [place] [address] [phone]         Preferred lab: [facility])     The patient has been notified to complete fasting labs prior to their physical appointment.

## 2023-05-30 NOTE — TELEPHONE ENCOUNTER
For several week when patient wipes her self she sees blood on the tissue, maybe a drop/clot in the toilet but only when she pees. No pain, no burning, no urgency to go.  She wonders what she should do

## 2023-05-31 NOTE — TELEPHONE ENCOUNTER
Called and talked to patient she has had blood on tissue when wiping for last 3 weeks denies UTI symptoms made an appointment for Friday as this was the day she could come in.

## 2023-06-01 ENCOUNTER — OFFICE VISIT (OUTPATIENT)
Dept: FAMILY MEDICINE CLINIC | Facility: CLINIC | Age: 58
End: 2023-06-01
Payer: COMMERCIAL

## 2023-06-01 ENCOUNTER — TELEPHONE (OUTPATIENT)
Dept: FAMILY MEDICINE CLINIC | Facility: CLINIC | Age: 58
End: 2023-06-01

## 2023-06-01 VITALS
SYSTOLIC BLOOD PRESSURE: 100 MMHG | DIASTOLIC BLOOD PRESSURE: 70 MMHG | HEART RATE: 83 BPM | OXYGEN SATURATION: 96 % | HEIGHT: 62 IN | WEIGHT: 146 LBS | BODY MASS INDEX: 26.87 KG/M2

## 2023-06-01 DIAGNOSIS — N95.0 POST-MENOPAUSAL BLEEDING: Primary | ICD-10-CM

## 2023-06-01 PROCEDURE — 3078F DIAST BP <80 MM HG: CPT | Performed by: STUDENT IN AN ORGANIZED HEALTH CARE EDUCATION/TRAINING PROGRAM

## 2023-06-01 PROCEDURE — 3008F BODY MASS INDEX DOCD: CPT | Performed by: STUDENT IN AN ORGANIZED HEALTH CARE EDUCATION/TRAINING PROGRAM

## 2023-06-01 PROCEDURE — 3074F SYST BP LT 130 MM HG: CPT | Performed by: STUDENT IN AN ORGANIZED HEALTH CARE EDUCATION/TRAINING PROGRAM

## 2023-06-01 PROCEDURE — 99214 OFFICE O/P EST MOD 30 MIN: CPT | Performed by: STUDENT IN AN ORGANIZED HEALTH CARE EDUCATION/TRAINING PROGRAM

## 2023-06-01 NOTE — TELEPHONE ENCOUNTER
Spoke to pt who saw Dr. Ray Kang today. Pt was given a referral for OBGYN - Dr. Jude Kaminski for post-menopausal blleeding and Dr. Ray Kang informed the pt if she was unable to get an appt next week, to call our office for clinical assistance. Pt did call and the next available appt was several weeks out. So per Dr. Ray Kang, pt needs assistance in getting an appt with Dr. Onesimo Ayala sooner.

## 2023-06-05 NOTE — TELEPHONE ENCOUNTER
Stevie Chadwick she can see any gyn, can you help her get in? This is for post-menopausal bleeding and she needs a uterine biopsy.  Thank you

## 2023-06-06 NOTE — TELEPHONE ENCOUNTER
Called OBGYN office and scheduled pt for 6/12 @ 1 with Dr Prince Cartagena. Patient states she is traveling next week and can't make that appointment. Patient will call OBGYN office to cancel appt. She states she will try to find a different provider who can see her this week.     Future Appointments   Date Time Provider Uzair Leslie   6/12/2023  1:00 PM Bennie Puente MD [facility] [place]   6/30/2023  4:00 PM PF CYNTHIA RM1 PF [facility] [place]   9/29/2023  8:15 AM Leo Varela MD [facility] 3 [facility] [place]

## 2023-06-30 ENCOUNTER — HOSPITAL ENCOUNTER (OUTPATIENT)
Dept: MAMMOGRAPHY | Age: 58
Discharge: HOME | End: 2023-06-30
Attending: FAMILY MEDICINE
Payer: COMMERCIAL

## 2023-06-30 DIAGNOSIS — Z12.31 VISIT FOR SCREENING MAMMOGRAM: ICD-10-CM

## 2023-06-30 PROCEDURE — 77063 BREAST TOMOSYNTHESIS BI: CPT | Performed by: FAMILY MEDICINE

## 2023-06-30 PROCEDURE — 77067 SCR MAMMO BI INCL CAD: CPT | Performed by: FAMILY MEDICINE

## 2023-07-01 ENCOUNTER — LAB ENCOUNTER (OUTPATIENT)
Dept: LAB | Age: 58
End: 2023-07-01
Attending: FAMILY MEDICINE
Payer: COMMERCIAL

## 2023-07-01 DIAGNOSIS — Z00.00 ROUTINE GENERAL MEDICAL EXAMINATION AT A HEALTH CARE FACILITY: ICD-10-CM

## 2023-07-01 LAB
ALBUMIN SERPL-MCNC: 4.1 G/DL (ref 3.4–5)
ALBUMIN/GLOB SERPL: 1.2 {RATIO} (ref 1–2)
ALP LIVER SERPL-CCNC: 83 U/L
ALT SERPL-CCNC: 21 U/L
ANION GAP SERPL CALC-SCNC: 1 MMOL/L (ref 0–18)
AST SERPL-CCNC: 18 U/L (ref 15–37)
BILIRUB SERPL-MCNC: 0.7 MG/DL (ref 0.1–2)
BUN BLD-MCNC: 13 MG/DL (ref 7–18)
CALCIUM BLD-MCNC: 9.2 MG/DL (ref 8.5–10.1)
CHLORIDE SERPL-SCNC: 111 MMOL/L (ref 98–112)
CHOLEST SERPL-MCNC: 212 MG/DL (ref ?–200)
CO2 SERPL-SCNC: 27 MMOL/L (ref 21–32)
CREAT BLD-MCNC: 0.62 MG/DL
ERYTHROCYTE [DISTWIDTH] IN BLOOD BY AUTOMATED COUNT: 18.5 %
FASTING PATIENT LIPID ANSWER: YES
FASTING STATUS PATIENT QL REPORTED: YES
GFR SERPLBLD BASED ON 1.73 SQ M-ARVRAT: 104 ML/MIN/1.73M2 (ref 60–?)
GLOBULIN PLAS-MCNC: 3.4 G/DL (ref 2.8–4.4)
GLUCOSE BLD-MCNC: 95 MG/DL (ref 70–99)
HCT VFR BLD AUTO: 44.4 %
HDLC SERPL-MCNC: 65 MG/DL (ref 40–59)
HGB BLD-MCNC: 13.5 G/DL
LDLC SERPL CALC-MCNC: 130 MG/DL (ref ?–100)
MCH RBC QN AUTO: 19.8 PG (ref 26–34)
MCHC RBC AUTO-ENTMCNC: 30.4 G/DL (ref 31–37)
MCV RBC AUTO: 65 FL
NONHDLC SERPL-MCNC: 147 MG/DL (ref ?–130)
OSMOLALITY SERPL CALC.SUM OF ELEC: 288 MOSM/KG (ref 275–295)
PLATELET # BLD AUTO: 211 10(3)UL (ref 150–450)
POTASSIUM SERPL-SCNC: 4.1 MMOL/L (ref 3.5–5.1)
PROT SERPL-MCNC: 7.5 G/DL (ref 6.4–8.2)
RBC # BLD AUTO: 6.83 X10(6)UL
SODIUM SERPL-SCNC: 139 MMOL/L (ref 136–145)
TRIGL SERPL-MCNC: 96 MG/DL (ref 30–149)
TSI SER-ACNC: 0.69 MIU/ML (ref 0.36–3.74)
VLDLC SERPL CALC-MCNC: 17 MG/DL (ref 0–30)
WBC # BLD AUTO: 6.8 X10(3) UL (ref 4–11)

## 2023-07-01 PROCEDURE — 36415 COLL VENOUS BLD VENIPUNCTURE: CPT

## 2023-07-01 PROCEDURE — 84443 ASSAY THYROID STIM HORMONE: CPT

## 2023-07-01 PROCEDURE — 80061 LIPID PANEL: CPT

## 2023-07-01 PROCEDURE — 80053 COMPREHEN METABOLIC PANEL: CPT

## 2023-07-01 PROCEDURE — 85027 COMPLETE CBC AUTOMATED: CPT

## 2023-09-29 ENCOUNTER — OFFICE VISIT (OUTPATIENT)
Dept: FAMILY MEDICINE CLINIC | Facility: CLINIC | Age: 58
End: 2023-09-29
Payer: COMMERCIAL

## 2023-09-29 VITALS
SYSTOLIC BLOOD PRESSURE: 110 MMHG | WEIGHT: 144.38 LBS | DIASTOLIC BLOOD PRESSURE: 74 MMHG | RESPIRATION RATE: 16 BRPM | HEART RATE: 71 BPM | BODY MASS INDEX: 26.91 KG/M2 | HEIGHT: 61.5 IN | TEMPERATURE: 98 F

## 2023-09-29 DIAGNOSIS — M72.2 PLANTAR FASCIITIS: ICD-10-CM

## 2023-09-29 DIAGNOSIS — Z12.83 SKIN CANCER SCREENING: ICD-10-CM

## 2023-09-29 DIAGNOSIS — Z12.31 VISIT FOR SCREENING MAMMOGRAM: ICD-10-CM

## 2023-09-29 DIAGNOSIS — Z00.00 ROUTINE GENERAL MEDICAL EXAMINATION AT A HEALTH CARE FACILITY: Primary | ICD-10-CM

## 2023-09-29 PROCEDURE — 3008F BODY MASS INDEX DOCD: CPT | Performed by: FAMILY MEDICINE

## 2023-09-29 PROCEDURE — 3078F DIAST BP <80 MM HG: CPT | Performed by: FAMILY MEDICINE

## 2023-09-29 PROCEDURE — 99396 PREV VISIT EST AGE 40-64: CPT | Performed by: FAMILY MEDICINE

## 2023-09-29 PROCEDURE — 3074F SYST BP LT 130 MM HG: CPT | Performed by: FAMILY MEDICINE

## 2023-09-29 RX ORDER — NAPROXEN 500 MG/1
500 TABLET ORAL
Qty: 30 TABLET | Refills: 5 | Status: SHIPPED | OUTPATIENT
Start: 2023-09-29

## 2023-11-29 ENCOUNTER — TELEPHONE (OUTPATIENT)
Dept: FAMILY MEDICINE CLINIC | Facility: CLINIC | Age: 58
End: 2023-11-29

## 2023-11-29 DIAGNOSIS — Z23 NEED FOR IMMUNIZATION AGAINST TYPHOID: Primary | ICD-10-CM

## 2023-11-29 NOTE — TELEPHONE ENCOUNTER
Pt is traveling on dec 17 for vacation she would like to have before she's goes on vacation. send to [facility]  Typhoid Vaccine Oral Capsule Delayed Release   4 capsule    Pt want to know if she can have Hepatitis A and B vaccine

## 2023-12-01 NOTE — TELEPHONE ENCOUNTER
Called and talked to patient told her of the new script for typhoid being sent in and then told her it is OK to get the HEP A&B vaccines.

## 2024-06-21 ENCOUNTER — TELEPHONE (OUTPATIENT)
Dept: FAMILY MEDICINE CLINIC | Facility: CLINIC | Age: 59
End: 2024-06-21

## 2024-06-21 DIAGNOSIS — Z00.00 ROUTINE GENERAL MEDICAL EXAMINATION AT A HEALTH CARE FACILITY: Primary | ICD-10-CM

## 2024-06-21 NOTE — TELEPHONE ENCOUNTER
Please enter lab orders for the patient's upcoming physical appointment.     Physical scheduled:   Your appointments       Date & Time Appointment Department ([place])    Sep 06, 2024 11:30 AM CDT Physical - Established with Ida Washburn MD [facility] ([facility])              [facility]  [address]  [facility] [address]  [phone]           Preferred lab: [facility] ([facility])     The patient has been notified to complete fasting labs prior to their physical appointment.

## 2024-07-12 ENCOUNTER — HOSPITAL ENCOUNTER (OUTPATIENT)
Dept: MAMMOGRAPHY | Age: 59
Discharge: HOME | End: 2024-07-12
Attending: FAMILY MEDICINE
Payer: COMMERCIAL

## 2024-07-12 DIAGNOSIS — Z12.31 VISIT FOR SCREENING MAMMOGRAM: ICD-10-CM

## 2024-07-12 PROCEDURE — 77063 BREAST TOMOSYNTHESIS BI: CPT | Performed by: FAMILY MEDICINE

## 2024-07-12 PROCEDURE — 77067 SCR MAMMO BI INCL CAD: CPT | Performed by: FAMILY MEDICINE

## 2024-07-13 ENCOUNTER — LABORATORY ENCOUNTER (OUTPATIENT)
Dept: LAB | Age: 59
End: 2024-07-13
Attending: FAMILY MEDICINE
Payer: COMMERCIAL

## 2024-07-13 DIAGNOSIS — Z00.00 ROUTINE GENERAL MEDICAL EXAMINATION AT A HEALTH CARE FACILITY: ICD-10-CM

## 2024-07-13 LAB
ALBUMIN SERPL-MCNC: 3.8 G/DL (ref 3.4–5)
ALBUMIN/GLOB SERPL: 1 {RATIO} (ref 1–2)
ALP LIVER SERPL-CCNC: 84 U/L
ALT SERPL-CCNC: 22 U/L
ANION GAP SERPL CALC-SCNC: 9 MMOL/L (ref 0–18)
AST SERPL-CCNC: 11 U/L (ref 15–37)
BILIRUB SERPL-MCNC: 0.6 MG/DL (ref 0.1–2)
BUN BLD-MCNC: 12 MG/DL (ref 9–23)
CALCIUM BLD-MCNC: 9.2 MG/DL (ref 8.5–10.1)
CHLORIDE SERPL-SCNC: 109 MMOL/L (ref 98–112)
CHOLEST SERPL-MCNC: 214 MG/DL (ref ?–200)
CO2 SERPL-SCNC: 25 MMOL/L (ref 21–32)
CREAT BLD-MCNC: 0.67 MG/DL
EGFRCR SERPLBLD CKD-EPI 2021: 101 ML/MIN/1.73M2 (ref 60–?)
ERYTHROCYTE [DISTWIDTH] IN BLOOD BY AUTOMATED COUNT: 19.2 %
FASTING PATIENT LIPID ANSWER: YES
FASTING STATUS PATIENT QL REPORTED: YES
GLOBULIN PLAS-MCNC: 3.9 G/DL (ref 2.8–4.4)
GLUCOSE BLD-MCNC: 97 MG/DL (ref 70–99)
HCT VFR BLD AUTO: 44.9 %
HDLC SERPL-MCNC: 64 MG/DL (ref 40–59)
HGB BLD-MCNC: 13.3 G/DL
LDLC SERPL CALC-MCNC: 136 MG/DL (ref ?–100)
MCH RBC QN AUTO: 19.9 PG (ref 26–34)
MCHC RBC AUTO-ENTMCNC: 29.6 G/DL (ref 31–37)
MCV RBC AUTO: 67.1 FL
NONHDLC SERPL-MCNC: 150 MG/DL (ref ?–130)
OSMOLALITY SERPL CALC.SUM OF ELEC: 296 MOSM/KG (ref 275–295)
PLATELET # BLD AUTO: 214 10(3)UL (ref 150–450)
PLATELETS.RETICULATED NFR BLD AUTO: 6.5 % (ref 0–7)
POTASSIUM SERPL-SCNC: 4.2 MMOL/L (ref 3.5–5.1)
PROT SERPL-MCNC: 7.7 G/DL (ref 6.4–8.2)
RBC # BLD AUTO: 6.69 X10(6)UL
SODIUM SERPL-SCNC: 143 MMOL/L (ref 136–145)
TRIGL SERPL-MCNC: 78 MG/DL (ref 30–149)
TSI SER-ACNC: 0.7 MIU/ML (ref 0.36–3.74)
VLDLC SERPL CALC-MCNC: 14 MG/DL (ref 0–30)
WBC # BLD AUTO: 7.4 X10(3) UL (ref 4–11)

## 2024-07-13 PROCEDURE — 80061 LIPID PANEL: CPT | Performed by: FAMILY MEDICINE

## 2024-07-13 PROCEDURE — 80050 GENERAL HEALTH PANEL: CPT | Performed by: FAMILY MEDICINE

## 2024-09-06 ENCOUNTER — OFFICE VISIT (OUTPATIENT)
Dept: FAMILY MEDICINE CLINIC | Facility: CLINIC | Age: 59
End: 2024-09-06
Payer: COMMERCIAL

## 2024-09-06 VITALS
DIASTOLIC BLOOD PRESSURE: 70 MMHG | HEIGHT: 61.5 IN | SYSTOLIC BLOOD PRESSURE: 112 MMHG | HEART RATE: 74 BPM | TEMPERATURE: 98 F | WEIGHT: 141 LBS | BODY MASS INDEX: 26.28 KG/M2 | RESPIRATION RATE: 16 BRPM

## 2024-09-06 DIAGNOSIS — Z00.00 ROUTINE GENERAL MEDICAL EXAMINATION AT A HEALTH CARE FACILITY: Primary | ICD-10-CM

## 2024-09-06 DIAGNOSIS — Z12.31 VISIT FOR SCREENING MAMMOGRAM: ICD-10-CM

## 2024-09-06 DIAGNOSIS — R09.81 NASAL CONGESTION: ICD-10-CM

## 2024-09-06 PROCEDURE — 3008F BODY MASS INDEX DOCD: CPT | Performed by: FAMILY MEDICINE

## 2024-09-06 PROCEDURE — 3074F SYST BP LT 130 MM HG: CPT | Performed by: FAMILY MEDICINE

## 2024-09-06 PROCEDURE — 3078F DIAST BP <80 MM HG: CPT | Performed by: FAMILY MEDICINE

## 2024-09-06 PROCEDURE — 99396 PREV VISIT EST AGE 40-64: CPT | Performed by: FAMILY MEDICINE

## 2024-09-06 NOTE — PROGRESS NOTES
HPI:   Geovanna Crabtree is a 58 year old female who presents for a complete physical exam.  Last pap: 9/26/22  Last mammogram:  7/2024  Menses: LMP 3/2019  Contraception: Vasectomy  Previous colonoscopy:  11/2016 - repeat in 10 years.   Previous DEXA:  n/a.  Current or previous treatment:  no  Family hx of breast, ovarian, cervical or colon CA:  Mother breast x 2, initial dx in her 50s.  Dx in 50s and 60. Now 85 with new lump.  Lives in [facility].   Immunizations:  Tdap:  6/2017, Flu:  /, Pneumo:  /, Singles:  8/25/22, 1/2023  Children:  3    MCV 67.1, Hgb 13.  This is stable.  Daughter has hx of alpha thalasseimia (dx at age 2)      Postmenopausal bleeding:  normal endometrial biopsy 1/2024 - Dr. Noland    Seasonal allergies: taking allegra and prn benadryl. May want to see ENT for chronic nighttime congestion.    Wt Readings from Last 6 Encounters:   09/06/24 141 lb (64 kg)   09/29/23 144 lb 6.4 oz (65.5 kg)   06/01/23 146 lb (66.2 kg)   09/26/22 145 lb (65.8 kg)   01/19/20 133 lb (60.3 kg)   11/05/19 132 lb (59.9 kg)     Body mass index is 26.21 kg/m².     Cholesterol, Total (mg/dL)   Date Value   07/13/2024 214 (H)   07/01/2023 212 (H)   06/29/2022 226 (H)   04/26/2019 202     HDL Cholesterol (mg/dL)   Date Value   07/13/2024 64 (H)   07/01/2023 65 (H)   06/29/2022 72 (H)   04/26/2019 78     LDL Cholesterol (mg/dL)   Date Value   07/13/2024 136 (H)   07/01/2023 130 (H)   06/29/2022 137 (H)        Current Outpatient Medications   Medication Sig Dispense Refill    Typhoid Vaccine Oral Capsule Delayed Release Take 1 capsule by mouth every other day. 4 capsule 0    Fluticasone Propionate (FLONASE) 50 MCG/ACT Nasal Suspension by Each Nare route daily as needed for Rhinitis.      Multiple Vitamins-Minerals (MULTIVITAMIN & MINERAL OR) Take  by mouth.        No past medical history on file.   Past Surgical History:   Procedure Laterality Date    Colonoscopy N/A 11/10/2016    Procedure: COLONOSCOPY;  Surgeon:  Micheal Thomas MD;  Location:  ENDOSCOPY    Egd N/A 11/10/2016    Procedure: ESOPHAGOGASTRODUODENOSCOPY (EGD);  Surgeon: Micheal Thomas MD;  Location:  ENDOSCOPY    Ferdinand biopsy stereo nodule 1 site right (cpt=19081) Right 2019    benign.    Ferdinand localization wire 1 site right (cpt=19281) Right     benign.    Other surgical history      PNEUMONIA      Family History   Problem Relation Age of Onset    Cancer Mother         recurrent Breast CA, first dx late 50s    Breast Cancer Mother         early 50s    Cancer Father 62        LUNG    Other (Alpha-thalassemia Trait) Daughter       Social History:   Social History     Socioeconomic History    Marital status:    Tobacco Use    Smoking status: Never    Smokeless tobacco: Never   Vaping Use    Vaping status: Never Used   Substance and Sexual Activity    Alcohol use: No     Alcohol/week: 0.0 standard drinks of alcohol    Drug use: No   Other Topics Concern    Caffeine Concern Yes     Comment: 3 x a week    Exercise Yes     Comment: walk 4 x a week    Seat Belt Yes    Self-Exams No     Comment: Self breast exam every 3-4 months     : yes. Children: three; .      REVIEW OF SYSTEMS:   GENERAL: feels well otherwise  SKIN: denies any unusual skin lesions  EYES:no vision problems  LUNGS: denies shortness of breath  CARDIOVASCULAR: denies chest pain  GI: denies abdominal pain,  No constipation or diarrhea  : denies dysuria, vaginal discharge or itching  NEURO: denies headaches  PSYCHE: denies depression or anxiety    EXAM:   /70   Pulse 74   Temp 97.7 °F (36.5 °C)   Resp 16   Ht 5' 1.5\" (1.562 m)   Wt 141 lb (64 kg)   BMI 26.21 kg/m²   Body mass index is 26.21 kg/m².   GENERAL: well developed, well nourished,in no apparent distress  SKIN: no rashes,no suspicious lesions  HEENT: atraumatic, normocephalic,throat are clear; dentition good  EYES:PERRLA, EOMI,conjunctiva are clear  NECK: supple,no adenopathy  BREAST: no  dominant or suspicious mass, no nipple discharge  LUNGS: clear to auscultation  CARDIO: RRR without murmur  GI: good BS's,no masses, HSM or tenderness  : /  EXTREMITIES: no edema    ASSESSMENT AND PLAN:   Geovanna Crabtree is a 58 year old female who presents for a complete physical exam.  Encounter Diagnoses   Name Primary?    Routine general medical examination at a health care facility Yes    Visit for screening mammogram     Nasal congestion      Pap: UTD  Mammogram: yearly  Dexascan:  n/a. Recommended daily Vit D supplements.  Labs reviewed  Colonoscopy:  UTD  Vaccines recommended today:  none  Recommended low fat diet and regular aerobic exercise.  The patient is asked to return for CPX in 1 year.  No orders of the defined types were placed in this encounter.      Meds & Refills for this Visit:  Requested Prescriptions      No prescriptions requested or ordered in this encounter       Imaging & Consults:  ENT - INTERNAL  CYNTHIA PAUL 2D+3D SCREENING BILAT (CPT=77067/54215)

## 2024-12-26 ENCOUNTER — TELEPHONE (OUTPATIENT)
Dept: FAMILY MEDICINE CLINIC | Facility: CLINIC | Age: 59
End: 2024-12-26

## 2024-12-26 NOTE — TELEPHONE ENCOUNTER
Called and talked to patient she has been treating OTC meds never had fever or chills we scheduled for Monday but she will consider going to [facility] over the weekend.

## 2024-12-26 NOTE — TELEPHONE ENCOUNTER
Geovanna is calling, she has started a cold on 12/19/24 and has no fever or muscle aches. Just cold symptoms and now she has a cough started on 12/23/24 and feels very fatigued. Please call

## 2025-04-10 ENCOUNTER — OFFICE VISIT (OUTPATIENT)
Dept: FAMILY MEDICINE CLINIC | Facility: CLINIC | Age: 60
End: 2025-04-10
Payer: COMMERCIAL

## 2025-04-10 VITALS
SYSTOLIC BLOOD PRESSURE: 114 MMHG | DIASTOLIC BLOOD PRESSURE: 60 MMHG | HEIGHT: 61 IN | RESPIRATION RATE: 18 BRPM | HEART RATE: 76 BPM | OXYGEN SATURATION: 99 % | BODY MASS INDEX: 26.62 KG/M2 | WEIGHT: 141 LBS

## 2025-04-10 DIAGNOSIS — R09.81 SINUS CONGESTION: Primary | ICD-10-CM

## 2025-04-10 PROCEDURE — 3008F BODY MASS INDEX DOCD: CPT | Performed by: NURSE PRACTITIONER

## 2025-04-10 PROCEDURE — 99213 OFFICE O/P EST LOW 20 MIN: CPT | Performed by: NURSE PRACTITIONER

## 2025-04-10 PROCEDURE — 3074F SYST BP LT 130 MM HG: CPT | Performed by: NURSE PRACTITIONER

## 2025-04-10 PROCEDURE — 3078F DIAST BP <80 MM HG: CPT | Performed by: NURSE PRACTITIONER

## 2025-04-10 RX ORDER — PREDNISONE 20 MG/1
20 TABLET ORAL DAILY
Qty: 4 TABLET | Refills: 0 | Status: SHIPPED | OUTPATIENT
Start: 2025-04-10 | End: 2025-04-14

## 2025-04-10 RX ORDER — AZELASTINE 1 MG/ML
1 SPRAY, METERED NASAL 2 TIMES DAILY
Qty: 30 ML | Refills: 0 | Status: SHIPPED | OUTPATIENT
Start: 2025-04-10

## 2025-04-10 NOTE — PROGRESS NOTES
Chief Complaint   Patient presents with    Nasal Congestion     Per pt has been having nasal congestion for the past two weeks     Fatigue     Per pt has been experiencing fatigue for the past couple of days         HPI:  Presents with approx 2 week history of nasal congestion, significant fatigue, occasional cough. Did have chills at onset of symptoms but these have resolved. Denies sore throat, ear pain/pressure, SOB/CRAFT, body aches. Has been treating with Sudafed and Benadryl with minimal relief. Has also been using Delsym with minimal relief.      Past Medical History[1]    Problem List[2]    Current Medications[3]    Physical Exam  /60   Pulse 76   Resp 18   Ht 5' 1\" (1.549 m)   Wt 141 lb (64 kg)   SpO2 99%   BMI 26.64 kg/m²   Constitutional: well developed, well nourished, in no apparent distress  HEENT: Normocephalic and atraumatic.Tympanic membranes clear with bulging bilat, no erythema or air/fluid levels. Oropharynx is erythematous without exudate, lesions or edema. (+)PND. No facial tenderness.  Eyes: Conjunctivae are pink and moist without drainage.   Lymphadenopathy: No cervical adenopathy.   Cardiovascular: Normal rate, regular rhythm.  No murmur.   Pulmonary/Chest: No respiratory distress. Effort normal. Breath sounds clear bilaterally. No wheezes, rhonchi or rales appreciated. Rare cough heard during exam.   Skin: Skin is warm and dry. No pallor. No rash noted.    A/P:    Encounter Diagnosis   Name Primary?    Sinus congestion- azelastine nasal spray BID. Prednisone 20mg daily for 4 days. Medications administration, use and side effects discussed. Instructed to notify office if not improved in 3-4 days or if symptoms worsen. Will send Augmentin. Medication administration, use and side effects discussed. Verbalized understanding of instructions and agreeable to this plan of care.       Yes       No orders of the defined types were placed in this encounter.      Meds & Refills for this  Visit:  Requested Prescriptions     Signed Prescriptions Disp Refills    azelastine 0.1 % Nasal Solution 30 mL 0     Si spray by Nasal route 2 (two) times daily.    predniSONE 20 MG Oral Tab 4 tablet 0     Sig: Take 1 tablet (20 mg total) by mouth daily for 4 days.       Imaging & Consults:  None    No follow-ups on file.  Patient Instructions                   Use Azelastine, one spray each nostril, morning and evening.       Take prednisone, 1 tab daily for 4 days. Take early in the morning.   Do not take any Motrin, Advil, ibuprofen products or Aleve while taking this medication.    It is ok to take Tylenol (acetaminophen) while taking this medication. Follow  instructions for dosing and frequency schedule.     Stop Sudafed. May continue Delsym.              All questions were answered and the patient understands the plan.          [1] History reviewed. No pertinent past medical history.  [2]   Patient Active Problem List  Diagnosis    Rheumatic fever without mention of heart involvement   [3]   Current Outpatient Medications   Medication Sig Dispense Refill    azelastine 0.1 % Nasal Solution 1 spray by Nasal route 2 (two) times daily. 30 mL 0    predniSONE 20 MG Oral Tab Take 1 tablet (20 mg total) by mouth daily for 4 days. 4 tablet 0    Fluticasone Propionate (FLONASE) 50 MCG/ACT Nasal Suspension by Each Nare route daily as needed for Rhinitis.      Multiple Vitamins-Minerals (MULTIVITAMIN & MINERAL OR) Take  by mouth.      Typhoid Vaccine Oral Capsule Delayed Release Take 1 capsule by mouth every other day. (Patient not taking: Reported on 4/10/2025) 4 capsule 0

## 2025-04-10 NOTE — PATIENT INSTRUCTIONS
Use Azelastine, one spray each nostril, morning and evening.       Take prednisone, 1 tab daily for 4 days. Take early in the morning.   Do not take any Motrin, Advil, ibuprofen products or Aleve while taking this medication.    It is ok to take Tylenol (acetaminophen) while taking this medication. Follow  instructions for dosing and frequency schedule.     Stop Sudafed. May continue Delsym.

## 2025-08-01 ENCOUNTER — HOSPITAL ENCOUNTER (OUTPATIENT)
Dept: MAMMOGRAPHY | Age: 60
Discharge: HOME | End: 2025-08-01
Attending: FAMILY MEDICINE

## 2025-08-01 ENCOUNTER — TELEPHONE (OUTPATIENT)
Dept: FAMILY MEDICINE CLINIC | Facility: CLINIC | Age: 60
End: 2025-08-01

## 2025-08-01 DIAGNOSIS — Z00.00 ROUTINE GENERAL MEDICAL EXAMINATION AT A HEALTH CARE FACILITY: ICD-10-CM

## 2025-08-01 DIAGNOSIS — Z12.31 VISIT FOR SCREENING MAMMOGRAM: ICD-10-CM

## 2025-08-01 PROCEDURE — 77067 SCR MAMMO BI INCL CAD: CPT | Performed by: FAMILY MEDICINE

## 2025-08-01 PROCEDURE — 77063 BREAST TOMOSYNTHESIS BI: CPT | Performed by: FAMILY MEDICINE

## 2025-08-04 ENCOUNTER — TELEPHONE (OUTPATIENT)
Dept: FAMILY MEDICINE CLINIC | Facility: CLINIC | Age: 60
End: 2025-08-04

## 2025-08-25 ENCOUNTER — HOSPITAL ENCOUNTER (OUTPATIENT)
Dept: MAMMOGRAPHY | Age: 60
Discharge: HOME | End: 2025-08-25
Attending: FAMILY MEDICINE

## 2025-08-25 ENCOUNTER — HOSPITAL ENCOUNTER (OUTPATIENT)
Dept: ULTRASOUND IMAGING | Age: 60
Discharge: HOME | End: 2025-08-25
Attending: FAMILY MEDICINE

## 2025-08-25 DIAGNOSIS — R92.2 INCONCLUSIVE MAMMOGRAPHY: ICD-10-CM

## 2025-08-25 PROCEDURE — 77062 BREAST TOMOSYNTHESIS BI: CPT | Performed by: FAMILY MEDICINE

## 2025-08-25 PROCEDURE — 77066 DX MAMMO INCL CAD BI: CPT | Performed by: FAMILY MEDICINE

## 2025-08-25 PROCEDURE — 76642 ULTRASOUND BREAST LIMITED: CPT | Performed by: FAMILY MEDICINE

## 2025-08-29 ENCOUNTER — LAB ENCOUNTER (OUTPATIENT)
Dept: LAB | Age: 60
End: 2025-08-29
Attending: FAMILY MEDICINE

## 2025-08-29 DIAGNOSIS — Z00.00 ROUTINE GENERAL MEDICAL EXAMINATION AT A HEALTH CARE FACILITY: ICD-10-CM

## 2025-08-29 LAB
ALBUMIN SERPL-MCNC: 4.6 G/DL (ref 3.2–4.8)
ALBUMIN/GLOB SERPL: 1.6 (ref 1–2)
ALP LIVER SERPL-CCNC: 86 U/L (ref 46–118)
ALT SERPL-CCNC: 17 U/L (ref 10–49)
ANION GAP SERPL CALC-SCNC: 13 MMOL/L (ref 0–18)
AST SERPL-CCNC: 21 U/L (ref ?–34)
BILIRUB SERPL-MCNC: 0.7 MG/DL (ref 0.3–1.2)
BUN BLD-MCNC: 13 MG/DL (ref 9–23)
CALCIUM BLD-MCNC: 10.1 MG/DL (ref 8.7–10.6)
CHLORIDE SERPL-SCNC: 103 MMOL/L (ref 98–112)
CHOLEST SERPL-MCNC: 224 MG/DL (ref ?–200)
CO2 SERPL-SCNC: 26 MMOL/L (ref 21–32)
CREAT BLD-MCNC: 0.77 MG/DL (ref 0.55–1.02)
EGFRCR SERPLBLD CKD-EPI 2021: 89 ML/MIN/1.73M2 (ref 60–?)
ERYTHROCYTE [DISTWIDTH] IN BLOOD BY AUTOMATED COUNT: 18.4 %
FASTING PATIENT LIPID ANSWER: YES
FASTING STATUS PATIENT QL REPORTED: YES
GLOBULIN PLAS-MCNC: 2.8 G/DL (ref 2–3.5)
GLUCOSE BLD-MCNC: 106 MG/DL (ref 70–99)
HCT VFR BLD AUTO: 44.2 % (ref 35–48)
HDLC SERPL-MCNC: 74 MG/DL (ref 40–59)
HGB BLD-MCNC: 13.5 G/DL (ref 12–16)
LDLC SERPL CALC-MCNC: 139 MG/DL (ref ?–100)
MCH RBC QN AUTO: 20 PG (ref 26–34)
MCHC RBC AUTO-ENTMCNC: 30.5 G/DL (ref 31–37)
MCV RBC AUTO: 65.6 FL (ref 80–100)
NONHDLC SERPL-MCNC: 150 MG/DL (ref ?–130)
OSMOLALITY SERPL CALC.SUM OF ELEC: 295 MOSM/KG (ref 275–295)
PLATELET # BLD AUTO: 203 10(3)UL (ref 150–450)
PLATELETS.RETICULATED NFR BLD AUTO: 4.2 % (ref 0–7)
POTASSIUM SERPL-SCNC: 4.2 MMOL/L (ref 3.5–5.1)
PROT SERPL-MCNC: 7.4 G/DL (ref 5.7–8.2)
RBC # BLD AUTO: 6.74 X10(6)UL (ref 3.8–5.3)
SODIUM SERPL-SCNC: 142 MMOL/L (ref 136–145)
TRIGL SERPL-MCNC: 64 MG/DL (ref 30–149)
TSI SER-ACNC: 0.8 UIU/ML (ref 0.55–4.78)
VLDLC SERPL CALC-MCNC: 12 MG/DL (ref 0–30)
WBC # BLD AUTO: 7.5 X10(3) UL (ref 4–11)

## 2025-08-29 PROCEDURE — 84443 ASSAY THYROID STIM HORMONE: CPT

## 2025-08-29 PROCEDURE — 36415 COLL VENOUS BLD VENIPUNCTURE: CPT

## 2025-08-29 PROCEDURE — 80061 LIPID PANEL: CPT

## 2025-08-29 PROCEDURE — 80053 COMPREHEN METABOLIC PANEL: CPT

## 2025-08-29 PROCEDURE — 85027 COMPLETE CBC AUTOMATED: CPT

## (undated) NOTE — MR AVS SNAPSHOT
[address] [facility] [address]  [facility],  [address]  [address] [place] [address] [phone]               Thank you for choosing us for your health care visit with Ellan Hatchet, APN.   We are glad to serve you and happy to provide you with this Assoc Dx:  Routine general medical examination at a health care facility [Z00.00], Screening for endocrine, nutritional, metabolic and immunity disorder [Z13.29, Z13.21, Z13.228, Z13.0]           Vitamin B12 [E]    Complete by:  Jun 03, 2017 (Approximate) Hormones are chemicals that have specific jobs in the body. A menstrual cycle is caused by changes in the levels of 2 female hormones. These hormones are estrogen and progesterone. They are made by the ovaries.  In a normal cycle, estrogen creates a lining during this time. Signs of depression can include often feeling sad or hopeless. If you feel this way, be sure to talk to your health care provider. · Gabapentin. This is a medication also used to treat seizures.  This controls hot flashes and night sweats Visit [facility] online at  [email or web address]